# Patient Record
Sex: FEMALE | Race: WHITE | URBAN - METROPOLITAN AREA
[De-identification: names, ages, dates, MRNs, and addresses within clinical notes are randomized per-mention and may not be internally consistent; named-entity substitution may affect disease eponyms.]

---

## 2017-02-23 ENCOUNTER — TRANSFERRED RECORDS (OUTPATIENT)
Dept: HEALTH INFORMATION MANAGEMENT | Facility: CLINIC | Age: 52
End: 2017-02-23

## 2017-06-12 ENCOUNTER — TRANSFERRED RECORDS (OUTPATIENT)
Dept: HEALTH INFORMATION MANAGEMENT | Facility: CLINIC | Age: 52
End: 2017-06-12

## 2017-06-13 ENCOUNTER — TRANSFERRED RECORDS (OUTPATIENT)
Dept: HEALTH INFORMATION MANAGEMENT | Facility: CLINIC | Age: 52
End: 2017-06-13

## 2017-09-12 ENCOUNTER — TRANSFERRED RECORDS (OUTPATIENT)
Dept: HEALTH INFORMATION MANAGEMENT | Facility: CLINIC | Age: 52
End: 2017-09-12

## 2017-12-15 ENCOUNTER — TRANSFERRED RECORDS (OUTPATIENT)
Dept: HEALTH INFORMATION MANAGEMENT | Facility: CLINIC | Age: 52
End: 2017-12-15

## 2018-02-02 ENCOUNTER — TRANSFERRED RECORDS (OUTPATIENT)
Dept: HEALTH INFORMATION MANAGEMENT | Facility: CLINIC | Age: 53
End: 2018-02-02

## 2018-08-22 ENCOUNTER — TELEPHONE (OUTPATIENT)
Dept: RHEUMATOLOGY | Facility: CLINIC | Age: 53
End: 2018-08-22

## 2018-08-22 NOTE — TELEPHONE ENCOUNTER
Patient is being referred from Dr. Kira Zarate at the [facility]- Rheumatology Department to see Dr. Rigo Lawson for Systemic Sclerosis. Patient has seen Dr. Lawson in the past, I see a visit from 2011 in her chart. Referral and Medical records will be faxed to the clinic for review. Please contact the patient directly at [phone] for scheduling.

## 2018-08-22 NOTE — LETTER
September 4, 2018    Mckenzie Heredia  [address] [address]      RE: Mckenzie Heredia 1965    Hello,    The above named patient, Mckenzie Heredia, has requested to see us at the Adult Rheumatology Clinic at the [facility] for systemic sclerosis. Please send us the following information from the last two years if available:      Previous Rheumatolgy visit notes    All lab results relating to the reason for visit    Any biopsy/pathology results relating to the reason for visit    Pulmonary Function test, EKG    Imaging studies: chest x-ray, CT scan, MRI (reports and images)    Referring provider notes (if applicable)    The information requested will provide the reviewing Rheumatologist with the necessary information. Thank you for your prompt response.    [facility]  Adult Rheumatology Clinic  Clinics and Surgery Center  Phone: [phone]  Fax: [phone]

## 2018-08-24 ENCOUNTER — TRANSFERRED RECORDS (OUTPATIENT)
Dept: HEALTH INFORMATION MANAGEMENT | Facility: CLINIC | Age: 53
End: 2018-08-24

## 2018-08-29 NOTE — TELEPHONE ENCOUNTER
Scleroderma/Myositis Patient Intake Form    What is the name of the referring physician? Kira Nevarez at [facility]    Have you been diagnosed with Scleroderma/Myositis/Dermatomyositis? Patient was diagnosed with systemic sclerosis 6/2004 by Humberto Clark at [facility]    Have you seen a Rheumatologist in the past?  Yes, Dr. Nevarez currently, Dr. Lawson in 2011     If yes, is this a second opinion or transfer of care? Patient is not sure. She stated that Dr. Nevarez is looking for Dr. Lawson's expertise Are you wanting to establish care here unsure What is the reason that you do not want to go back to the previous Rheumatologist that you saw? NA    Have you ever been in the hospital due to Scleroderma? no    Have you been diagnosed with Fibromyalgia? no    Who manages your care for this issue now? Dr. Nevarez at [facility]     Have you ever been diagnosed with a lung condition? no If so what? NA    Have you ever been diagnosed with a heart condition? no If so what? NA    What is your most urgent concern at this time? Patient just wants to make sure that she is taking the correct medications    Have you ever had any of the following tests? Where and when?      Upper endoscopy:  Patient is unsure    Chest x-ray: patientis unsure    High resolution CT scan: yes 8/2018 at [facility]    Pulmonary function test:  Yes 8/2018 at [facility]    EKG: patient is not sure    Right heart Cath: no    ECHO cardiogram:  yes    Barium swallow:  Yes 1.5 years ago at [facility]    Have you ever seen the following specialists: Where and when?      Pulmonologist: yes 12/2017 at [facility]    Gastroenterologist: yes for a colonoscopy    Cardiologist: no    Dermatologist: yes at [facility]    Patient agrees to obtain the records and have them faxed to us. Will discuss with Dr. Lawson regarding length of appointment. I will call patient back after  discussion with provider.     Cat Nicholas CMA  8/29/2018 2:53 PM

## 2018-09-05 ENCOUNTER — TRANSFERRED RECORDS (OUTPATIENT)
Dept: HEALTH INFORMATION MANAGEMENT | Facility: CLINIC | Age: 53
End: 2018-09-05

## 2018-09-06 NOTE — PROGRESS NOTES
[facility]  Rheumatology Clinic  Rigo Lawson MD  2018     Name: Mckenzie Heredia  MRN: [id number]  Age: 52 year old  : 1965  Referring provider: Kira Nevarez     Assessment and Plan:  There are no diagnoses linked to this encounter.     Since I previously evaluated the patient in , she has been doing well for about 5 years prior to developing skin tightening on her buttocks, sides, and abdomen this July. She had been tapered off of methotrexate and was off of this by 2018 and had tapered down to 500 mg daily Cellcept. After onset of symptoms in July, she was started back on 15 mg weekly methotrexate and remains on 12.5 mg. Her Cellcept was also gradually increased and her current dose is 2,000 mg daily.     A primary complaint of hers continues to be her calcinosis, however she is more concerned about the recent disease progression and findings suggestive of ILD on recent PFT and HRCT. She otherwise endorses Raynaud's attacks in the cold but wears gloves to keep her hands warm. She has remained active and has had no dyspnea, shortness of breath, or dry cough. She also denies dry eyes, dry mouth, heartburn, reflux, or dysphagia.     I recommend she continue on her dose of 2,000 mg daily Cellcept and repeat PFT every 3-4 months. If her skin involvement and PFTs are not worsening, she can continue on this dose for at least 18-24 months. If she remains stable, she can then taper the Cellcept. If PFTs are not worsening, she does not need to repeat HRCT. If this does worsen, she can repeat HRCT and increase Cellcept dose. If PFTs are stable for a year, she can then begin repeating them every 6 months. I reviewed her HRCT, which did show ground glass opacity. She had recent ECHO and has no evidence of pulmonary hypertension but we discussed that she could develop this in the future.     She can discontinue methotrexate. As she comes off of this, she should monitor for worsening of joint pain or skin  involvement. Specifically, she can have tightening of her shoulder tendons. If she worsens within 3-6 weeks, she may have been benefiting from the methotrexate more than appreciated and can consider restarting this.     If she has not had RNA polymerase III antibody checked, or if she had this antibody and began worsening, consideration should be given to doing cancer screening, including CT scan chest, abdomen, and pelvis.  She measures her blood pressure daily but does not record the values, so she was asked to record these daily to monitor for any trending upwards.    We discussed that as Cellcept is immunosuppressive, she should get the influenza vaccine and should consider the Shingrix vaccine. If she does develop an infection, fever, or gets placed on antibiotics, she should hold Cellcept temporarily.     She can continue performing her typical activities and exercises. We discussed that as her second toe is longer than her 1st toe, she should wear shoes that do not place pressure on the second toe to avoid development of calcinosis there. We discussed the lack of clear data supporting any medication approach, though a minority of pts. Seem t have some improvement with probenecid or bisphosphonates.     This visit was 80 mins in duration, the majority spent in counseling.       Follow-up: Follow-up PRN     HPI:   Mckenzie Heredia is a 52 year old female with diffuse cutaneous systemic sclerosis, onset of Raynaud's and skin swelling in 01/2004 and diagnosed in 06/2004. The patient lives in [place] and is followed by Dr. Nevarez and is seen in consultation at her request. .      I last evaluated the patient in 2011, at which time my impression was diffuse cutaneous systemic sclerosis with approximately 7 years of disease, which was entering chronic atrophic skin phase at that time. She had shown no evidence of progression of cardiopulmonary disease. We discussed tapering her mycophenolate mofetil dose slowly while  monitoring PFTs and continuing methotrexate while she worked on her hand contractures. Her calcinosis was one of her more troubling aspects and we discussed a trial of Probenecid 500 mg BID. Today she reports that she did try this but does not recall any improvement with the medication. I also recommended DEXA scan with administration of IV bisphosphonate or possibly denosumab if she had significantly low bone density. She has since had a scan showing osteopenia but has not used any bisphosphonates.      Today she reports that she had been doing well for the past 5 years and has remained active, but this July she was siting on concrete in the sun for about 1.5 hours and the next day had onset of pain, burning, tightening, and redness over her lower buttocks, which progressed to her hips and thighs, and then onto her abdomen. This seemed to be worse in the morning and improved at night.     She is being followed by Dr. Nevarez. Since I previously evaluated her, her Cellcept was tapered down from 2500 mg to 500 mg daily. Her methotrexate had been tapered down from 15 mg weekly and she was completely off of this by January or February 2018. When she worsened this July, her Cellcept was doubled to 1000 mg and then was increased to 2000 mg daily, which is her current dose. She was then also placed on 15 mg weekly methotrexate but became ill and developed diarrhea on this and was decreased to 12.5 mg. Her diarrhea improved on this lower dose. The skin tightening on her trunk is improved on the Cellcept but she continues to notice this. She was in the stem cell injection clinical trial in [place] in 2015 but did not feel like she benefited from this. She had liposuction in January 2016 and felt like she had developed redness in those areas on her abdomen.     She continues to have Raynaud's in her hands and feet in the cold but wears gloves for this. She had nonhealing wounds on her fingers and knuckles in the past but  none currently. She frequently has calcinosis deposits removed in the hands and feet. She did have puffiness in her fingers in the past and has contractures throughout her PIPs. She initially had skin thickening to her fingers, wrists, elbows, and now to her upper arms, abdomen, sides, and buttocks. She has tightness in the face. She had joint swelling and stiffness in the past but this is minor now. She also had muscle weakness in the past but denies this or muscle pain currently. She has had increased fatigue over the past several weeks. At night she frequently has anxiety and has difficulty breathing but denies any dyspnea on exertion, shortness of breath at rest otherwise, or chronic dry cough. No history of small bowel overgrowth or bowel obstruction. She has some weight loss in the past but denies this currently. She denies any heartburn, reflux, or dysphagia. She additionally denies dry eyes or dry mouth.     She has no history of heart problems or arrhythmia. She additionally denies history of hypertension, dialysis, or blood clots. She checks her blood pressure daily and it typically runs around 90/60, but she does not record the values daily. She does have occasional lightheadedness, which she attributes to being on methotrexate, and some loss of balance associated with this. She also endorses migraine headaches. She denies history of seizure, stroke, or Bell's palsy. She additionally denies history of carpal tunnel.     Review of Systems:   Pertinent items are noted in HPI or as below, remainder of complete ROS is negative.      No recent problems with hearing or vision. No swallowing problems.   No breathing difficulty, shortness of breath, coughing, or wheezing  No chest pain or palpitations  No heart burn, indigestion, abdominal pain, nausea, vomiting, diarrhea  No urination problems, no bloody, cloudy urine, no dysuria  No numbing, tingling, weakness  No headaches or confusion  No rashes. No easy  "bleeding or bruising.   + skin tightening     Answers for HPI/ROS submitted by the patient on 9/8/2018   PHQ-2 Score: 0    Active Medications:      Cholecalciferol (VITAMIN D) 2000 units tablet, 2 tablets daily, Disp: , Rfl:      folic acid (FOLVITE) 1 MG tablet, Take 1 mg by mouth, Disp: , Rfl:      methotrexate 2.5 MG tablet CHEMO, Take 15 mg by mouth, Disp: , Rfl:      mycophenolate (GENERIC EQUIVALENT) 500 MG tablet, Take 1,000 mg by mouth, Disp: , Rfl:      triamcinolone (KENALOG) 0.1 % cream, Apply twice daily to all affected areas for 2-3 weeks., Disp: , Rfl:       Allergies:   Red dye and Citalopram      Past Medical History:  Miscarriage at 8 weeks   Goiter   Anxiety and reactive depression   Low-grade anemia   Systemic sclerosis      Past Surgical History:  Calcinosis finger surgery  Liposuction January 2016     Family History:   Maternal cousin: Lupus      Social History:   Patient denies history of tobacco use.   Patient denies alcohol consumption, history of consuming 2 drinks per week.   Patient denies history of drug use.     Physical Exam:   /72  Pulse 79  Temp 98  F (36.7  C) (Oral)  Ht 1.651 m (5' 5\")  Wt 54 kg (119 lb)  SpO2 99%  BMI 19.8 kg/m2   Wt Readings from Last 4 Encounters:   09/08/18 54 kg (119 lb)     Constitutional: Well-developed, appearing stated age; cooperative  Eyes: Normal EOM, PERRLA, vision, conjunctiva, sclera  ENT: Normal external ears, nose, hearing, lips, teeth, gums, throat. No mucous membrane lesions, normal saliva pool  Neck: No mass or thyroid enlargement  Resp: Lungs clear to auscultation, nl to palpation  CV: RRR, no murmurs, rubs or gallops, no edema  GI: No ABD mass or tenderness, no HSM  : Not tested  Lymph: No cervical, supraclavicular, inguinal or epitrochlear nodes  MS: The TMJ, neck, shoulder, spine, hip, knee, and ankle joints were examined and found normal. No active synovitis or altered joint anatomy. Full joint ROM. No dactylitis,  " tenosynovitis, enthesopathy. Bilateral CMC squaring. Loss of range of motion in the wrists, primarily with flexion limited to 15-20  , bilaterally. PIP contractures of 90   or greater throughout all of her 2nd-5th PIPs of the hands. Modest contractures of no more than 10   in both elbows. Good movement in the shoulders.   Skin: No nail pitting, alopecia, rash, nodules or lesions. Atrophic changes present to her feet and elsewhere. Apparent calcinosis with some associated modest pitting in bilateral 2nd toes, right more than left. Prominent atrophic changes in the dorsum of the hands.   Neuro: Normal cranial nerves, strength, sensation, DTRs.   Psych: Normal judgement, orientation, memory, affect.     Rodnan Skin Score: Total Score of: 8  Face: 1+    Anterior Chest: 0  Abdomen: 1+   Upper arms, bilateral: 0   Forearm, bilateral: 0  Hands, bilateral: 0  Fingers, bilateral: 2+  Lateral thighs, bilateral: 0  Lateral shins/calves, bilateral: 0  Feet, bilateral: 1+    ECHO 09/05/2018:   Component Name Value Ref Range   Inferior Vena Cava Diameter at Expiration 1.63 cm   LVIDd 4.12 cm   FS 38 %   LA volume 15.7 mL   Ascending aorta 3.2 cm   AV mean gradient 3 mmHg   Aortic Valve Area by Continuity VTI 2.79 cm2   MV mean gradient 1 mmHg   MV Area by P 1/2 method 4.23 cm2   MV Area by Continuity Equation 3.72 cm2   PV peak gradient 2.31 mmHg   LVOT diameter 2.3 cm   LVOT peak robe 78.3 cm/s   LVOT peak VTI 13.1 cm   AV peak robe 107 cm/s   AV  VTI 19.5 cm   AV peak gradient 4.58 mmHg   MV peak gradient 1 mmHg   MV Pressure 1/2 time 52 msec   LA Volume Index 10 mL/m2   AV LVOT Peak Gradient 2 mmHg   AV LVOT Mean Gradient 2 mmHg   TR Peak Gradient 17 mmHg   TR Velocity 209 cm   PI Peak Velocity 76 cm/s   LV Diastolic Volume MOD 4C 50.2 mL   LV Systolic Volume MOD 4C 15.6 mL   LV Diastolic Length 4C 6.72 cm   LV Diastolic Volume MOD 2C 52.1 mL   LV Systolic Volume MOD 2C 16.5 mL   LV Systolic Area PSAX 9.89 cm2   RV Diastolic  Basal Diameter 2.04 cm   LV Bhatia's Biplane EF 68 %   LV ED Volume (Bhatia's) 53.3 ml   LV ED Volume Index 34 ml/m2   LV ES Volume 17.1 ml   LVOT Mean Velocity 60.9 cm/s   RV Systolic Pressure 20.47 mmHg   MV E' Lateral Velocity 6.85 cm/s   MV E' Septal Velocity 6.42 cm/s   MV Deceleration Time 211 msec   MV E/A Ratio 1.34     MV Mean Velocity 42.8 cm/s   MV Peak A-Wave 44.1 cm/s   MV Peak E-Wave 59.1 cm/s   AV Mean Velocity 79.4 cm/s   LA Area 8.51 cm2   MV E/E SEPTAL 9.21     MV  E/E LATERAL 8.63     LV ES Volume Index 11 ml/m2   Cardiac Output 4.35 l/min   Cardiac Index 2.75 l/min/m2   RA Minor 3.17 cm   RA Major 4.58 cm   Teicholz Ef Calc 68.16 %   Stroke Volume 35.6 ml   Stroke Volume Index 22.53 ml/m2   LV Area Diastolic 19.8 cm2   IVS Diastolic Thickness MM 0.8 cm   LVPW Diastolic Thickness MM 0.91 cm   LV Systolic Diameter MM 2.57 cm   Estimated RAP 3 mmHg   BASELINE BLOOD PRESSURE 108/71 mmHg   Patient Weight (lbs) 118 lb.     Patient Height 65 in.     Heart Rate 80     LVEF-TTE TRANSTHORACIC ECHO 68 %   RA PRESSURE 3 mmHg   RVSP Estimated 20 mmHg   Result Narrative   1. Normal left ventricular size and systolic function. LVEF 65-70%.  2. No significant valvular abnormalities.   3. No significant changes when compared to the 2017 study.      Imaging:   CT Chest High Resolution without contrast 08/27/2018:   1. Subpleural ground-glass opacity with very mild interlobular septal  thickening with relative sparing of the subpleural line, suggesting in  nonspecific interstitial pneumonitis (NSIP) pattern of lung disease.  This pattern can be seen in very early UIP.  Imaging surveillance would  likely be beneficial.  2. No asymmetric air trapping on expiratory imaging.  3. No definite esophageal dilatation on the current study.  4. Nonobstructing nephrolith in the left kidney.  Per radiology.   Pulmonary function test 08/24/2018:   Flow volume loops appear generally normal.  Forced expiration  demonstrates  normal spirometry.  Total lung capacity is normal.  DLCO is mildly reduced.  Compared to prior testing there has been no significant change in  spirometric lung function.  DLCO has declined by 17%.  Reading MD: Ziggy Mills    Laboratory:               Scribe Disclosure:   I, Oralia Aurea, am serving as a scribe to document services personally performed by Rigo Lawson MD at this visit, based upon the provider's statements to me. All documentation has been reviewed by the aforementioned provider prior to being entered into the official medical record.     Portions of this medical record were completed by a scribe. UPON MY REVIEW AND AUTHENTICATION BY ELECTRONIC SIGNATURE, this confirms (a) I performed the applicable clinical services, and (b) the record is accurate.      GENESIS Lawson MD, PhD    Rheumatology

## 2018-09-08 ENCOUNTER — OFFICE VISIT (OUTPATIENT)
Dept: RHEUMATOLOGY | Facility: CLINIC | Age: 53
End: 2018-09-08
Attending: INTERNAL MEDICINE
Payer: MEDICARE

## 2018-09-08 VITALS
SYSTOLIC BLOOD PRESSURE: 116 MMHG | HEIGHT: 65 IN | DIASTOLIC BLOOD PRESSURE: 72 MMHG | BODY MASS INDEX: 19.83 KG/M2 | OXYGEN SATURATION: 99 % | HEART RATE: 79 BPM | WEIGHT: 119 LBS | TEMPERATURE: 98 F

## 2018-09-08 DIAGNOSIS — M34.9 SYSTEMIC SCLEROSIS (H): Primary | ICD-10-CM

## 2018-09-08 DIAGNOSIS — I73.00 RAYNAUD'S DISEASE WITHOUT GANGRENE: ICD-10-CM

## 2018-09-08 DIAGNOSIS — J84.9 ILD (INTERSTITIAL LUNG DISEASE) (H): ICD-10-CM

## 2018-09-08 DIAGNOSIS — E83.59 CALCINOSIS: ICD-10-CM

## 2018-09-08 PROCEDURE — G0463 HOSPITAL OUTPT CLINIC VISIT: HCPCS | Mod: ZF

## 2018-09-08 RX ORDER — TRIAMCINOLONE ACETONIDE 1 MG/G
CREAM TOPICAL
COMMUNITY
Start: 2018-08-14

## 2018-09-08 RX ORDER — CHOLECALCIFEROL (VITAMIN D3) 50 MCG
TABLET ORAL
COMMUNITY
Start: 2013-01-17

## 2018-09-08 RX ORDER — FOLIC ACID 1 MG/1
1 TABLET ORAL
COMMUNITY
Start: 2018-08-20

## 2018-09-08 RX ORDER — MYCOPHENOLATE MOFETIL 500 MG/1
1000 TABLET ORAL
COMMUNITY
Start: 2018-08-20

## 2018-09-08 ASSESSMENT — PAIN SCALES - GENERAL: PAINLEVEL: NO PAIN (0)

## 2018-09-08 NOTE — PATIENT INSTRUCTIONS
Preventive Care:    Breast Cancer Screening: During our visit today, we discussed that it is recommended you receive breast cancer screening. Please call or make an appointment with your primary care provider to discuss this with them. You may also call the  [facility] scheduling line ([phone]) to set up a mammography appointment at the Breast Center within the [facility].    Colorectal Cancer Screening: During our visit today, we discussed that it is recommended you receive colorectal cancer screening. Please call or make an appointment with your primary care provider to discuss this. You may also call the  [facility] scheduling line ([phone]) to set up a colonoscopy appointment.

## 2018-09-08 NOTE — LETTER
2018      RE: Mckenzie Heredia  [address] [facility] [address]       [facility]  Rheumatology Clinic  Rigo Lawson MD  2018     Name: Mckenzie Heredia  MRN: [id number]  Age: 52 year old  : 1965  Referring provider: Kira Nevarez     Assessment and Plan:  There are no diagnoses linked to this encounter.     Since I previously evaluated the patient in , she has been doing well for about 5 years prior to developing skin tightening on her buttocks, sides, and abdomen this July. She had been tapered off of methotrexate and was off of this by 2018 and had tapered down to 500 mg daily Cellcept. After onset of symptoms in July, she was started back on 15 mg weekly methotrexate and remains on 12.5 mg. Her Cellcept was also gradually increased and her current dose is 2,000 mg daily.     A primary complaint of hers continues to be her calcinosis, however she is more concerned about the recent disease progression and findings suggestive of ILD on recent PFT and HRCT. She otherwise endorses Raynaud's attacks in the cold but wears gloves to keep her hands warm. She has remained active and has had no dyspnea, shortness of breath, or dry cough. She also denies dry eyes, dry mouth, heartburn, reflux, or dysphagia.     I recommend she continue on her dose of 2,000 mg daily Cellcept and repeat PFT every 3-4 months. If her skin involvement and PFTs are not worsening, she can continue on this dose for at least 18-24 months. If she remains stable, she can then taper the Cellcept. If PFTs are not worsening, she does not need to repeat HRCT. If this does worsen, she can repeat HRCT and increase Cellcept dose. If PFTs are stable for a year, she can then begin repeating them every 6 months. I reviewed her HRCT, which did show ground glass opacity. She had recent ECHO and has no evidence of pulmonary hypertension but we discussed that she could develop this in the future.     She can discontinue methotrexate. As  she comes off of this, she should monitor for worsening of joint pain or skin involvement. Specifically, she can have tightening of her shoulder tendons. If she worsens within 3-6 weeks, she may have been benefiting from the methotrexate more than appreciated and can consider restarting this.     If she has not had RNA polymerase III antibody checked, or if she had this antibody and began worsening, consideration should be given to doing cancer screening, including CT scan chest, abdomen, and pelvis.  She measures her blood pressure daily but does not record the values, so she was asked to record these daily to monitor for any trending upwards.    We discussed that as Cellcept is immunosuppressive, she should get the influenza vaccine and should consider the Shingrix vaccine. If she does develop an infection, fever, or gets placed on antibiotics, she should hold Cellcept temporarily.     She can continue performing her typical activities and exercises. We discussed that as her second toe is longer than her 1st toe, she should wear shoes that do not place pressure on the second toe to avoid development of calcinosis there. We discussed the lack of clear data supporting any medication approach, though a minority of pts. Seem t have some improvement with probenecid or bisphosphonates.     This visit was 80 mins in duration, the majority spent in counseling.       Follow-up: Follow-up PRN     HPI:   Mckenzie Heredia is a 52 year old female with diffuse cutaneous systemic sclerosis, onset of Raynaud's and skin swelling in 01/2004 and diagnosed in 06/2004. The patient lives in [place] and is followed by Dr. Nevarez and is seen in consultation at her request. .      I last evaluated the patient in 2011, at which time my impression was diffuse cutaneous systemic sclerosis with approximately 7 years of disease, which was entering chronic atrophic skin phase at that time. She had shown no evidence of progression of cardiopulmonary  disease. We discussed tapering her mycophenolate mofetil dose slowly while monitoring PFTs and continuing methotrexate while she worked on her hand contractures. Her calcinosis was one of her more troubling aspects and we discussed a trial of Probenecid 500 mg BID. Today she reports that she did try this but does not recall any improvement with the medication. I also recommended DEXA scan with administration of IV bisphosphonate or possibly denosumab if she had significantly low bone density. She has since had a scan showing osteopenia but has not used any bisphosphonates.      Today she reports that she had been doing well for the past 5 years and has remained active, but this July she was siting on concrete in the sun for about 1.5 hours and the next day had onset of pain, burning, tightening, and redness over her lower buttocks, which progressed to her hips and thighs, and then onto her abdomen. This seemed to be worse in the morning and improved at night.     She is being followed by Dr. Nevarez. Since I previously evaluated her, her Cellcept was tapered down from 2500 mg to 500 mg daily. Her methotrexate had been tapered down from 15 mg weekly and she was completely off of this by January or February 2018. When she worsened this July, her Cellcept was doubled to 1000 mg and then was increased to 2000 mg daily, which is her current dose. She was then also placed on 15 mg weekly methotrexate but became ill and developed diarrhea on this and was decreased to 12.5 mg. Her diarrhea improved on this lower dose. The skin tightening on her trunk is improved on the Cellcept but she continues to notice this. She was in the stem cell injection clinical trial in [place] in 2015 but did not feel like she benefited from this. She had liposuction in January 2016 and felt like she had developed redness in those areas on her abdomen.     She continues to have Raynaud's in her hands and feet in the cold but wears gloves for  this. She had nonhealing wounds on her fingers and knuckles in the past but none currently. She frequently has calcinosis deposits removed in the hands and feet. She did have puffiness in her fingers in the past and has contractures throughout her PIPs. She initially had skin thickening to her fingers, wrists, elbows, and now to her upper arms, abdomen, sides, and buttocks. She has tightness in the face. She had joint swelling and stiffness in the past but this is minor now. She also had muscle weakness in the past but denies this or muscle pain currently. She has had increased fatigue over the past several weeks. At night she frequently has anxiety and has difficulty breathing but denies any dyspnea on exertion, shortness of breath at rest otherwise, or chronic dry cough. No history of small bowel overgrowth or bowel obstruction. She has some weight loss in the past but denies this currently. She denies any heartburn, reflux, or dysphagia. She additionally denies dry eyes or dry mouth.     She has no history of heart problems or arrhythmia. She additionally denies history of hypertension, dialysis, or blood clots. She checks her blood pressure daily and it typically runs around 90/60, but she does not record the values daily. She does have occasional lightheadedness, which she attributes to being on methotrexate, and some loss of balance associated with this. She also endorses migraine headaches. She denies history of seizure, stroke, or Bell's palsy. She additionally denies history of carpal tunnel.     Review of Systems:   Pertinent items are noted in HPI or as below, remainder of complete ROS is negative.      No recent problems with hearing or vision. No swallowing problems.   No breathing difficulty, shortness of breath, coughing, or wheezing  No chest pain or palpitations  No heart burn, indigestion, abdominal pain, nausea, vomiting, diarrhea  No urination problems, no bloody, cloudy urine, no dysuria  No  "numbing, tingling, weakness  No headaches or confusion  No rashes. No easy bleeding or bruising.   + skin tightening     Answers for HPI/ROS submitted by the patient on 9/8/2018   PHQ-2 Score: 0    Active Medications:      Cholecalciferol (VITAMIN D) 2000 units tablet, 2 tablets daily, Disp: , Rfl:      folic acid (FOLVITE) 1 MG tablet, Take 1 mg by mouth, Disp: , Rfl:      methotrexate 2.5 MG tablet CHEMO, Take 15 mg by mouth, Disp: , Rfl:      mycophenolate (GENERIC EQUIVALENT) 500 MG tablet, Take 1,000 mg by mouth, Disp: , Rfl:      triamcinolone (KENALOG) 0.1 % cream, Apply twice daily to all affected areas for 2-3 weeks., Disp: , Rfl:       Allergies:   Red dye and Citalopram      Past Medical History:  Miscarriage at 8 weeks   Goiter   Anxiety and reactive depression   Low-grade anemia   Systemic sclerosis      Past Surgical History:  Calcinosis finger surgery  Liposuction January 2016     Family History:   Maternal cousin: Lupus      Social History:   Patient denies history of tobacco use.   Patient denies alcohol consumption, history of consuming 2 drinks per week.   Patient denies history of drug use.     Physical Exam:   /72  Pulse 79  Temp 98  F (36.7  C) (Oral)  Ht 1.651 m (5' 5\")  Wt 54 kg (119 lb)  SpO2 99%  BMI 19.8 kg/m2   Wt Readings from Last 4 Encounters:   09/08/18 54 kg (119 lb)     Constitutional: Well-developed, appearing stated age; cooperative  Eyes: Normal EOM, PERRLA, vision, conjunctiva, sclera  ENT: Normal external ears, nose, hearing, lips, teeth, gums, throat. No mucous membrane lesions, normal saliva pool  Neck: No mass or thyroid enlargement  Resp: Lungs clear to auscultation, nl to palpation  CV: RRR, no murmurs, rubs or gallops, no edema  GI: No ABD mass or tenderness, no HSM  : Not tested  Lymph: No cervical, supraclavicular, inguinal or epitrochlear nodes  MS: The TMJ, neck, shoulder, spine, hip, knee, and ankle joints were examined and found normal. No active " synovitis or altered joint anatomy. Full joint ROM. No dactylitis,  tenosynovitis, enthesopathy. Bilateral CMC squaring. Loss of range of motion in the wrists, primarily with flexion limited to 15-20   , bilaterally. PIP contractures of 90   or greater throughout all of her 2nd-5th PIPs of the hands. Modest contractures of no more than 10   in both elbows. Good movement in the shoulders.   Skin: No nail pitting, alopecia, rash, nodules or lesions. Atrophic changes present to her feet and elsewhere. Apparent calcinosis with some associated modest pitting in bilateral 2nd toes, right more than left. Prominent atrophic changes in the dorsum of the hands.   Neuro: Normal cranial nerves, strength, sensation, DTRs.   Psych: Normal judgement, orientation, memory, affect.     Rodnan Skin Score: Total Score of: 8  Face: 1+    Anterior Chest: 0  Abdomen: 1+   Upper arms, bilateral: 0   Forearm, bilateral: 0  Hands, bilateral: 0  Fingers, bilateral: 2+  Lateral thighs, bilateral: 0  Lateral shins/calves, bilateral: 0  Feet, bilateral: 1+    ECHO 09/05/2018:   Component Name Value Ref Range   Inferior Vena Cava Diameter at Expiration 1.63 cm   LVIDd 4.12 cm   FS 38 %   LA volume 15.7 mL   Ascending aorta 3.2 cm   AV mean gradient 3 mmHg   Aortic Valve Area by Continuity VTI 2.79 cm2   MV mean gradient 1 mmHg   MV Area by P 1/2 method 4.23 cm2   MV Area by Continuity Equation 3.72 cm2   PV peak gradient 2.31 mmHg   LVOT diameter 2.3 cm   LVOT peak robe 78.3 cm/s   LVOT peak VTI 13.1 cm   AV peak robe 107 cm/s   AV  VTI 19.5 cm   AV peak gradient 4.58 mmHg   MV peak gradient 1 mmHg   MV Pressure 1/2 time 52 msec   LA Volume Index 10 mL/m2   AV LVOT Peak Gradient 2 mmHg   AV LVOT Mean Gradient 2 mmHg   TR Peak Gradient 17 mmHg   TR Velocity 209 cm   PI Peak Velocity 76 cm/s   LV Diastolic Volume MOD 4C 50.2 mL   LV Systolic Volume MOD 4C 15.6 mL   LV Diastolic Length 4C 6.72 cm   LV Diastolic Volume MOD 2C 52.1 mL   LV Systolic  Volume MOD 2C 16.5 mL   LV Systolic Area PSAX 9.89 cm2   RV Diastolic Basal Diameter 2.04 cm   LV Bhatia's Biplane EF 68 %   LV ED Volume (Bhatia's) 53.3 ml   LV ED Volume Index 34 ml/m2   LV ES Volume 17.1 ml   LVOT Mean Velocity 60.9 cm/s   RV Systolic Pressure 20.47 mmHg   MV E' Lateral Velocity 6.85 cm/s   MV E' Septal Velocity 6.42 cm/s   MV Deceleration Time 211 msec   MV E/A Ratio 1.34     MV Mean Velocity 42.8 cm/s   MV Peak A-Wave 44.1 cm/s   MV Peak E-Wave 59.1 cm/s   AV Mean Velocity 79.4 cm/s   LA Area 8.51 cm2   MV E/E SEPTAL 9.21     MV  E/E LATERAL 8.63     LV ES Volume Index 11 ml/m2   Cardiac Output 4.35 l/min   Cardiac Index 2.75 l/min/m2   RA Minor 3.17 cm   RA Major 4.58 cm   Teicholz Ef Calc 68.16 %   Stroke Volume 35.6 ml   Stroke Volume Index 22.53 ml/m2   LV Area Diastolic 19.8 cm2   IVS Diastolic Thickness MM 0.8 cm   LVPW Diastolic Thickness MM 0.91 cm   LV Systolic Diameter MM 2.57 cm   Estimated RAP 3 mmHg   BASELINE BLOOD PRESSURE 108/71 mmHg   Patient Weight (lbs) 118 lb.     Patient Height 65 in.     Heart Rate 80     LVEF-TTE TRANSTHORACIC ECHO 68 %   RA PRESSURE 3 mmHg   RVSP Estimated 20 mmHg   Result Narrative   1. Normal left ventricular size and systolic function. LVEF 65-70%.  2. No significant valvular abnormalities.   3. No significant changes when compared to the 2017 study.      Imaging:   CT Chest High Resolution without contrast 08/27/2018:   1. Subpleural ground-glass opacity with very mild interlobular septal  thickening with relative sparing of the subpleural line, suggesting in  nonspecific interstitial pneumonitis (NSIP) pattern of lung disease.  This pattern can be seen in very early UIP.  Imaging surveillance would  likely be beneficial.  2. No asymmetric air trapping on expiratory imaging.  3. No definite esophageal dilatation on the current study.  4. Nonobstructing nephrolith in the left kidney.  Per radiology.   Pulmonary function test 08/24/2018:   Flow  volume loops appear generally normal.  Forced expiration demonstrates  normal spirometry.  Total lung capacity is normal.  DLCO is mildly reduced.  Compared to prior testing there has been no significant change in  spirometric lung function.  DLCO has declined by 17%.  Reading MD: Ziggy Mills    Laboratory:               Scribe Disclosure:   I, Oralia Aurea, am serving as a scribe to document services personally performed by Rigo Lawson MD at this visit, based upon the provider's statements to me. All documentation has been reviewed by the aforementioned provider prior to being entered into the official medical record.     Portions of this medical record were completed by a scribe. UPON MY REVIEW AND AUTHENTICATION BY ELECTRONIC SIGNATURE, this confirms (a) I performed the applicable clinical services, and (b) the record is accurate.      GENESIS Lawson MD, PhD    Rheumatology      Rigo Lawson MD

## 2018-09-08 NOTE — MR AVS SNAPSHOT
After Visit Summary   9/8/2018    Mckenzie Heredia    MRN: [id number]           Patient Information     Date Of Birth          1965        Visit Information        Provider Department      9/8/2018 8:00 AM Rigo Lawson MD [facility] Rheumatology        Today's Diagnoses     Systemic sclerosis (H)    -  1    Calcinosis        ILD (interstitial lung disease) (H)        Raynaud's disease without gangrene          Care Instructions    Preventive Care:    Breast Cancer Screening: During our visit today, we discussed that it is recommended you receive breast cancer screening. Please call or make an appointment with your primary care provider to discuss this with them. You may also call the [facility] scheduling line ([phone]) to set up a mammography appointment at the [facility] within the [facility].    Colorectal Cancer Screening: During our visit today, we discussed that it is recommended you receive colorectal cancer screening. Please call or make an appointment with your primary care provider to discuss this. You may also call the [facility] scheduling line ([phone]) to set up a colonoscopy appointment.                Follow-ups after your visit        Who to contact     If you have questions or need follow up information about today's clinic visit or your schedule please contact [facility] RHEUMATOLOGY directly at [phone].  Normal or non-critical lab and imaging results will be communicated to you by MyChart, letter or phone within 4 business days after the clinic has received the results. If you do not hear from us within 7 days, please contact the clinic through MyChart or phone. If you have a critical or abnormal lab result, we will notify you by phone as soon as possible.  Submit refill requests through [employer] or call your pharmacy and they will forward the refill request to us. Please allow 3 business days for your refill to be completed.          Additional  "Information About Your Visit        MyChart Information     [employer] lets you send messages to your doctor, view your test results, renew your prescriptions, schedule appointments and more. To sign up, go to [email or web address] . Click on \"Log in\" on the left side of the screen, which will take you to the Welcome page. Then click on \"Sign up Now\" on the right side of the page.     You will be asked to enter the access code listed below, as well as some personal information. Please follow the directions to create your username and password.     Your access code is: [email or web address]  Expires: 2018  6:30 AM     Your access code will  in 90 days. If you need help or a new code, please call your [place] clinic or [phone].        Care EveryWhere ID     This is your Care EveryWhere ID. This could be used by other organizations to access your [place] medical records  [id number]        Your Vitals Were     Pulse Temperature Height Pulse Oximetry BMI (Body Mass Index)       79 98  F (36.7  C) (Oral) 1.651 m (5' 5\") 99% 19.8 kg/m2        Blood Pressure from Last 3 Encounters:   18 116/72    Weight from Last 3 Encounters:   18 54 kg (119 lb)              Today, you had the following     No orders found for display       Primary Care Provider Office Phone # Fax #    Kira Nevarez [phone]       [facility] [address] [address]        Equal Access to Services     [facility] BRIAN AH: Hadii aad ku hadasho Soomaali, waaxda luqadaha, qaybta kaalmada adeegyada, waxay junior valiente. So [facility] [phone].    ATENCIÓN: Si habla español, tiene a castorena disposición servicios gratuitos de asistencia lingüística. Llame al [phone].    We comply with applicable federal civil rights laws and [place] laws. We do not discriminate on the basis of race, color, national origin, age, disability, sex, sexual orientation, or gender identity.            Thank you!  "    Thank you for choosing [facility] RHEUMATOLOGY  for your care. Our goal is always to provide you with excellent care. Hearing back from our patients is one way we can continue to improve our services. Please take a few minutes to complete the written survey that you may receive in the mail after your visit with us. Thank you!             Your Updated Medication List - Protect others around you: Learn how to safely use, store and throw away your medicines at www.disposemymeds.org.          This list is accurate as of 9/8/18 11:59 PM.  Always use your most recent med list.                   Brand Name Dispense Instructions for use Diagnosis    folic acid 1 MG tablet    FOLVITE     Take 1 mg by mouth        methotrexate 2.5 MG tablet CHEMO      Take 15 mg by mouth        mycophenolate 500 MG tablet    GENERIC EQUIVALENT     Take 1,000 mg by mouth        triamcinolone 0.1 % cream    KENALOG     Apply twice daily to all affected areas for 2-3 weeks.        vitamin D 2000 units tablet      2 tablets daily

## 2018-09-08 NOTE — NURSING NOTE
"Chief Complaint   Patient presents with     Consult     systemic sclerosis      /72  Pulse 79  Temp 98  F (36.7  C) (Oral)  Ht 1.651 m (5' 5\")  Wt 54 kg (119 lb)  SpO2 99%  BMI 19.8 kg/m2  GRISELDA NIX CMA    "

## 2018-09-18 ENCOUNTER — TELEPHONE (OUTPATIENT)
Dept: RHEUMATOLOGY | Facility: CLINIC | Age: 53
End: 2018-09-18

## 2018-09-18 NOTE — TELEPHONE ENCOUNTER
[facility]    Phone Message    May a detailed message be left on voicemail: yes    Reason for Call: Other: The pt states her referring MD Kira Nevarez did not get a follow up letter after her recent visit. The pt has an upcoming appt w/Dr Nevarez and will be getting labs done then. The pt states that Dr Lawson told her to get an add'l blood test but she can't remember what the test is. Please add that to the letter. Also, the pt wasn't sure if Dr Lawson saw her CT and PFT, etc results that she brought with. Please incude your feedback from those tests. Thanks.     Action Taken: Message routed to:  [facility] [place] [facility]    Phone Message    May a detailed message be left on voicemail: yes    Reason for Call: Other: Adds Dr Nevarez's fax # [phone]    Spoke with patient as she was concerned that Dr Nevarez did not have Dr Lawson's note yet ( 9/8/18 note is unsigned). She is scheduled to go in for labs next week, then Dr Nevarez on 10/5/2018, and Dr Nevarez will use Dr Lawson's note to direct aspects of her care, such as ordering RNA polymerase III antibody. We reviewed that we'd be happy to send that out as soon as possible.     Action Taken: Message routed to:  [facility] ([facility] Rheumatology

## 2018-09-20 PROBLEM — M34.9 SYSTEMIC SCLEROSIS (H): Status: ACTIVE | Noted: 2018-09-20

## 2018-09-20 PROBLEM — E83.59 CALCINOSIS: Status: ACTIVE | Noted: 2018-09-20

## 2018-09-20 PROBLEM — I73.00 RAYNAUD'S DISEASE WITHOUT GANGRENE: Status: ACTIVE | Noted: 2018-09-20

## 2018-09-20 PROBLEM — J84.9 ILD (INTERSTITIAL LUNG DISEASE) (H): Status: ACTIVE | Noted: 2018-09-20

## 2018-09-20 NOTE — TELEPHONE ENCOUNTER
Dr. Lawson completed documentation and letter. This has been faxed to Dr. Nevarez at [phone]. Patient notified.  Cat Nicholas CMA  9/20/2018 3:46 PM